# Patient Record
Sex: MALE | URBAN - METROPOLITAN AREA
[De-identification: names, ages, dates, MRNs, and addresses within clinical notes are randomized per-mention and may not be internally consistent; named-entity substitution may affect disease eponyms.]

---

## 2021-01-05 ENCOUNTER — OFFICE VISIT (OUTPATIENT)
Dept: URGENT CARE | Facility: CLINIC | Age: 31
End: 2021-01-05
Payer: COMMERCIAL

## 2021-01-05 VITALS
DIASTOLIC BLOOD PRESSURE: 80 MMHG | TEMPERATURE: 99 F | HEIGHT: 68 IN | OXYGEN SATURATION: 98 % | RESPIRATION RATE: 16 BRPM | BODY MASS INDEX: 34.1 KG/M2 | WEIGHT: 225 LBS | SYSTOLIC BLOOD PRESSURE: 140 MMHG | HEART RATE: 67 BPM

## 2021-01-05 DIAGNOSIS — M54.9 MUSCULOSKELETAL BACK PAIN: Primary | ICD-10-CM

## 2021-01-05 PROCEDURE — 3008F BODY MASS INDEX DOCD: CPT | Mod: CPTII,S$GLB,, | Performed by: NURSE PRACTITIONER

## 2021-01-05 PROCEDURE — 99203 PR OFFICE/OUTPT VISIT, NEW, LEVL III, 30-44 MIN: ICD-10-PCS | Mod: S$GLB,,, | Performed by: NURSE PRACTITIONER

## 2021-01-05 PROCEDURE — 3008F PR BODY MASS INDEX (BMI) DOCUMENTED: ICD-10-PCS | Mod: CPTII,S$GLB,, | Performed by: NURSE PRACTITIONER

## 2021-01-05 PROCEDURE — 99203 OFFICE O/P NEW LOW 30 MIN: CPT | Mod: S$GLB,,, | Performed by: NURSE PRACTITIONER

## 2022-10-27 ENCOUNTER — HOSPITAL ENCOUNTER (EMERGENCY)
Facility: OTHER | Age: 32
Discharge: HOME | End: 2022-10-27
Payer: COMMERCIAL

## 2022-10-27 VITALS
TEMPERATURE: 100 F | RESPIRATION RATE: 17 BRPM | SYSTOLIC BLOOD PRESSURE: 134 MMHG | OXYGEN SATURATION: 98 % | HEART RATE: 98 BPM | DIASTOLIC BLOOD PRESSURE: 74 MMHG

## 2022-10-27 DIAGNOSIS — J10.1 INFLUENZA A: Primary | ICD-10-CM

## 2022-10-27 DIAGNOSIS — R07.89 CHEST WALL PAIN: ICD-10-CM

## 2022-10-27 DIAGNOSIS — R07.9 CHEST PAIN: ICD-10-CM

## 2022-10-27 LAB
CTP QC/QA: YES
CTP QC/QA: YES
POC MOLECULAR INFLUENZA A AGN: POSITIVE
POC MOLECULAR INFLUENZA B AGN: NEGATIVE
SARS-COV-2 RDRP RESP QL NAA+PROBE: NEGATIVE

## 2022-10-27 PROCEDURE — 87635 SARS-COV-2 COVID-19 AMP PRB: CPT | Performed by: PHYSICIAN ASSISTANT

## 2022-10-27 PROCEDURE — 99284 EMERGENCY DEPT VISIT MOD MDM: CPT | Mod: 25

## 2022-10-27 PROCEDURE — 25000003 PHARM REV CODE 250: Performed by: PHYSICIAN ASSISTANT

## 2022-10-27 PROCEDURE — 93005 ELECTROCARDIOGRAM TRACING: CPT

## 2022-10-27 PROCEDURE — 93010 ELECTROCARDIOGRAM REPORT: CPT | Mod: ,,, | Performed by: INTERNAL MEDICINE

## 2022-10-27 PROCEDURE — 93010 EKG 12-LEAD: ICD-10-PCS | Mod: ,,, | Performed by: INTERNAL MEDICINE

## 2022-10-27 RX ORDER — OSELTAMIVIR PHOSPHATE 75 MG/1
75 CAPSULE ORAL
Status: COMPLETED | OUTPATIENT
Start: 2022-10-27 | End: 2022-10-27

## 2022-10-27 RX ORDER — ONDANSETRON 4 MG/1
4 TABLET, ORALLY DISINTEGRATING ORAL EVERY 8 HOURS PRN
Qty: 30 TABLET | Refills: 0 | Status: SHIPPED | OUTPATIENT
Start: 2022-10-27

## 2022-10-27 RX ORDER — GUAIFENESIN AND DEXTROMETHORPHAN HYDROBROMIDE 1200; 60 MG/1; MG/1
1 TABLET, EXTENDED RELEASE ORAL 2 TIMES DAILY PRN
Qty: 30 TABLET | Refills: 0 | Status: SHIPPED | OUTPATIENT
Start: 2022-10-27 | End: 2022-11-06

## 2022-10-27 RX ORDER — CETIRIZINE HYDROCHLORIDE 10 MG/1
10 TABLET ORAL DAILY
Qty: 30 TABLET | Refills: 0 | Status: SHIPPED | OUTPATIENT
Start: 2022-10-27 | End: 2023-10-27

## 2022-10-27 RX ORDER — IBUPROFEN 600 MG/1
600 TABLET ORAL EVERY 6 HOURS PRN
Qty: 20 TABLET | Refills: 0 | Status: SHIPPED | OUTPATIENT
Start: 2022-10-27

## 2022-10-27 RX ORDER — OSELTAMIVIR PHOSPHATE 75 MG/1
75 CAPSULE ORAL 2 TIMES DAILY
Qty: 9 CAPSULE | Refills: 0 | Status: SHIPPED | OUTPATIENT
Start: 2022-10-27 | End: 2022-11-01

## 2022-10-27 RX ORDER — NAPROXEN 500 MG/1
500 TABLET ORAL
Status: COMPLETED | OUTPATIENT
Start: 2022-10-27 | End: 2022-10-27

## 2022-10-27 RX ORDER — ACETAMINOPHEN 325 MG/1
650 TABLET ORAL
Status: COMPLETED | OUTPATIENT
Start: 2022-10-27 | End: 2022-10-27

## 2022-10-27 RX ADMIN — OSELTAMIVIR PHOSPHATE 75 MG: 75 CAPSULE ORAL at 09:10

## 2022-10-27 RX ADMIN — NAPROXEN 500 MG: 500 TABLET ORAL at 08:10

## 2022-10-27 RX ADMIN — ACETAMINOPHEN 650 MG: 325 TABLET, FILM COATED ORAL at 08:10

## 2022-10-27 NOTE — Clinical Note
"Marleen Lin"Chad was seen and treated in our emergency department on 10/27/2022.  He may return to work on 10/31/2022.       If you have any questions or concerns, please don't hesitate to call.      BOYD Bower"

## 2022-10-28 NOTE — ED PROVIDER NOTES
CHIEF COMPLAINT:   Chief Complaint   Patient presents with    Headache     Reports headaches and body aches for the last week. States he has been on a z pack for the last 2 days and is not getting better. Reports left sided chest pain that is worse with deep inspiration. Resp even unlabored. Skin warm and dry. Aaox3.        HISTORY OF PRESENT ILLNESS: Marleen Bonilla who is a 31 y.o. presents to the emergency department today with complaint of general illness symptoms for the past few weeks.  Patient states that he has had waxing waning URI symptoms.  He has seen is ENT where he was prescribed a Z-Ermias approximately 1 month ago and then once again on Tuesday.  He states symptoms acutely worsened 2 days ago.  He states that he has headache, generalized body aches, subjective fever, chills and left-sided chest pain that is worse with coughing.  Denies any productive cough.  Denies any vomiting, diarrhea or rash.  Is taking a Z-Ermias as previously mentioned with no significant improvement.  Is also taking acetaminophen combination as prescribed by ENT with no significant improvement.    REVIEW OF SYSTEMS:  Constitutional: + fever, + chills.  Eyes: No discharge. No pain.  HENT: + nasal congestion, +  sore throat.   Cardiovascular: + chest pain with coughing, no palpitations.  Respiratory: + cough, no shortness of breath.  Gastrointestinal: + nausea, no diarrhea, No abdominal pain, no vomiting.   Genitourinary: No hematuria, dysuria, urgency.  Musculoskeletal: + back pain, + body aches.  Skin: No rashes, no lesions.  Neurological: + headache, no focal weakness.    Otherwise remaining ROS negative     ALLERGIES REVIEWED  MEDICATIONS REVIEWED  PMH/PSH/SOC/FH REVIEWED     The history is provided by the patient.    Nursing/Ancillary staff note reviewed.        PHYSICAL EXAM:  VS reviewed  Vitals:    10/27/22 2143   BP: 134/74   Pulse: 98   Resp: 17   Temp: 100 °F (37.8 °C)     Vitals:    10/27/22 2143   BP: 134/74   Pulse: 98    Resp: 17   Temp: 100 °F (37.8 °C)         General Appearance: The patient is alert, has no immediate or signs of toxicity. No acute distress. Warm to the touch. Speaking in full sentences with no distress.  HEENT: Eyes:  With no injection, No drainage. Nose with edema, oropharynx with mild erythema  Neck:Neck is with No stridor.   Respiratory: There are no retractions. Lungs CTA   Cardiovascular: Regular rate and rhythm. Reproducible chest wall TTP; no crepitus  Gastrointestinal:  Abdomen is without distention.   Neurological: Alert and oriented x 4. No focal weakness.  Skin: Warm and dry, no rashes.  Musculoskeletal: Extremities are non-swollen and have full range of motion.      No past medical history on file.      No past surgical history on file.      ED COURSE:     Results for orders placed or performed during the hospital encounter of 10/27/22   POCT Influenza A/B Molecular   Result Value Ref Range    POC Molecular Influenza A Ag Positive (A) Negative, Not Reported    POC Molecular Influenza B Ag Negative Negative, Not Reported     Acceptable Yes    POCT COVID-19 Rapid Screening   Result Value Ref Range    POC Rapid COVID Negative Negative     Acceptable Yes      Imaging Results              X-Ray Chest AP Portable (Final result)  Result time 10/27/22 20:33:37      Final result by Arsenio Porter MD (10/27/22 20:33:37)                   Impression:      No acute process.      Electronically signed by: Arsenio Porter MD  Date:    10/27/2022  Time:    20:33               Narrative:    EXAMINATION:  XR CHEST AP PORTABLE    CLINICAL HISTORY:  Chest pain, unspecified    TECHNIQUE:  Single frontal view of the chest was performed.    COMPARISON:  None    FINDINGS:  The trachea is unremarkable.  The cardiomediastinal silhouette is within normal limits.  The hilar structures are unremarkable.  There is no evidence of free air beneath the hemidiaphragms.  There are no pleural effusions.   There is no evidence of a pneumothorax.  There is no evidence of pneumomediastinum.  No airspace opacity is present.  The osseous structures are unremarkable.                                      Patient presenting with general illness symptoms; appears well and nontoxic. Exam grossly unremarkable at this time.    DIFFERENTIAL DIAGNOSIS: After history and physical exam a differential diagnosis was considered, but was not limited to,   Sepsis, meningitis, otitis media/external, nasal polyp, bacterial sinusitis, allergic rhinitis, influenza, COVID19, bacterial/viral pharyngitis, bacterial/viral pneumonia.    ED management: Patient seen for a viral-like illness, therefore due to the most recent recommendations from our hospital administrations/infectious disease at this time, the patient will be swabbed for COVID 19. Rapid COVID is negative,  flu noted to be positive for [place] A. CXR and EKG obtained given the reports of intermittent cp with coughing. EKG unremarkable. CXR with no focal consolidation. Instructed patient on symptomatic treatment and increase oral hydration. Patient w/in tamiflu window although discussed possible reduced efficacy given 48-72 hr initiation.  Vital signs did not indicate sepsis and patient was welling appearing, okay for discharge home.      IMPRESSION  The primary encounter diagnosis was Influenza A. Diagnoses of Chest pain and Chest wall pain were also pertinent to this visit. Strict instructions to follow up with primary care physician or reference provided for further assessment and evaluation. Given instructions to return for any acute symptoms and verbalized understanding of this medical plan.      Please pardon typos or dictation errors, as this note was transcribed using [employer] fluency direct dictation software.                                  BOYD Bower  10/28/22 2291

## 2022-10-28 NOTE — FIRST PROVIDER EVALUATION
Emergency Department TeleTriage Encounter Note      CHIEF COMPLAINT    Chief Complaint   Patient presents with    Headache     Reports headaches and body aches for the last week. States he has been on a z pack for the last 2 days and is not getting better. Reports left sided chest pain that is worse with deep inspiration. Resp even unlabored. Skin warm and dry. Aaox3.        VITAL SIGNS   Initial Vitals [10/27/22 1929]   BP Pulse Resp Temp SpO2   (!) 152/96 106 16 (!) 101.4 °F (38.6 °C) 99 %      MAP       --            ALLERGIES    Review of patient's allergies indicates:  No Known Allergies    PROVIDER TRIAGE NOTE  This is a teletriage evaluation of a 31 y.o. male presenting to the ED with c/o recurrence of fever, chills, headache, chest wall pain, cough. Difficult history? States was seen by ENT  3w ago for same given augmentin x 2, initially improved though now new symptoms. Has never been tested for viral etiology per patient.    PE:. Non-toxic/well-appearing. No respiratory distress, speaks in full sentences without issue. No active emesis nor cough. Normal eye contact and mentation.     Plan: swabs, CXR, EKG, meds. Further/augmented workup at discretion of examining provider.     All ED beds are full at present; patient notified of this status.  Patient seen and medically screened by RACHELL via teletriage. Orders initiated at triage to expedite care.  Patient is stable and will be placed in an ED bed when available.  Care will be transferred to an alternate provider when patient has been placed in an Exam Room further exam, additional orders, and disposition.         ORDERS  Labs Reviewed - No data to display    ED Orders (720h ago, onward)      None              Virtual Visit Note: The provider triage portion of this emergency department evaluation and documentation was performed via [employer], a HIPAA-compliant telemedicine application, in concert with a tele-presenter in the room. A face to face patient  evaluation with one of my colleagues will occur once the patient is placed in an emergency department room.      DISCLAIMER: This note was prepared with [employer] voice recognition transcription software. Garbled syntax, mangled pronouns, and other bizarre constructions may be attributed to that software system.